# Patient Record
Sex: FEMALE | Race: WHITE | URBAN - METROPOLITAN AREA
[De-identification: names, ages, dates, MRNs, and addresses within clinical notes are randomized per-mention and may not be internally consistent; named-entity substitution may affect disease eponyms.]

---
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
## 2024-07-22 ENCOUNTER — APPOINTMENT (OUTPATIENT)
Dept: ORTHOPEDIC SURGERY | Facility: CLINIC | Age: 24
End: 2024-07-22
Payer: COMMERCIAL

## 2024-07-22 ENCOUNTER — TRANSCRIPTION ENCOUNTER (OUTPATIENT)
Age: 24
End: 2024-07-22

## 2024-07-22 VITALS — WEIGHT: 130 LBS | BODY MASS INDEX: 21.66 KG/M2 | HEIGHT: 65 IN

## 2024-07-22 DIAGNOSIS — M25.361 OTHER INSTABILITY, RIGHT KNEE: ICD-10-CM

## 2024-07-22 DIAGNOSIS — M22.40 CHONDROMALACIA PATELLAE, UNSPECIFIED KNEE: ICD-10-CM

## 2024-07-22 DIAGNOSIS — M22.2X1 PATELLOFEMORAL DISORDERS, RIGHT KNEE: ICD-10-CM

## 2024-07-22 DIAGNOSIS — Z78.9 OTHER SPECIFIED HEALTH STATUS: ICD-10-CM

## 2024-07-22 DIAGNOSIS — Z00.00 ENCOUNTER FOR GENERAL ADULT MEDICAL EXAMINATION W/OUT ABNORMAL FINDINGS: ICD-10-CM

## 2024-07-22 DIAGNOSIS — M25.362 OTHER INSTABILITY, RIGHT KNEE: ICD-10-CM

## 2024-07-22 DIAGNOSIS — M22.2X2 PATELLOFEMORAL DISORDERS, RIGHT KNEE: ICD-10-CM

## 2024-07-22 DIAGNOSIS — J45.909 UNSPECIFIED ASTHMA, UNCOMPLICATED: ICD-10-CM

## 2024-07-22 PROCEDURE — 99204 OFFICE O/P NEW MOD 45 MIN: CPT

## 2024-07-22 PROCEDURE — 73564 X-RAY EXAM KNEE 4 OR MORE: CPT | Mod: 50

## 2024-07-22 RX ORDER — SERTRALINE HYDROCHLORIDE 25 MG/1
TABLET, FILM COATED ORAL
Refills: 0 | Status: ACTIVE | COMMUNITY

## 2024-07-30 NOTE — IMAGING
[de-identified] : LEFT KNEE Inspection: minimal effusion Palpation: medial facet of the patella tenderness Knee Range of Motion: 0-135 Strength: 5/5 Quadriceps strength, 5/5 Hamstring strength, 4/5 Hip Abductor strength Neurological: light touch is intact throughout Ligament Stability and Special Tests: McMurrays: neg Lachman: neg Pivot Shift: neg Posterior Drawer: neg Valgus: neg Varus: neg Patella Apprehension: neg Patella Maltracking: neg  RIGHT KNEE Inspection: minimal effusion Palpation: medial facet of the patella tenderness Knee Range of Motion: 0-135 Strength: 5/5 Quadriceps strength, 5/5 Hamstring strength, 4/5 Hip Abductor strength Neurological: light touch is intact throughout Ligament Stability and Special Tests: McMurrays: neg Lachman: neg Pivot Shift: neg Posterior Drawer: neg Valgus: neg Varus: neg Patella Apprehension: neg Patella Maltracking: neg [Bilateral] : knee bilaterally [There are no fractures, subluxations or dislocations. No significant abnormalities are seen] : There are no fractures, subluxations or dislocations. No significant abnormalities are seen

## 2024-07-30 NOTE — DISCUSSION/SUMMARY
[de-identified] : Assessment:   The patient presents with history, examination and imaging that are most consistent with a diagnosis of: BL patellar instability, patellar chondromalacia, and patellar syndrome   ***The patient would like to pursue conservative measures at this time. After consideration of various non-operative treatment modalities, the patient would like to proceed with the modalities listed below.  During this appointment the patient was examined, diagnoses were discussed and explained in a face to face manner. In addition extensive time was spent reviewing aforementioned diagnostic studies. Counseling including abnormal image results, differential diagnoses, treatment options, risk and benefits, lifestyle changes, current condition, and current medications was performed. Patient's comments, questions, and concerns were address and patient verbalized understanding.   We discussed their diagnosis and treatment options at length including the risks and benefits of both surgical and nonsurgical options. - We will continue conservative treatment with icing, anti-inflammatory medication, and PT - PT for quad/VMO strengthening and progress to dynamic core and glut exercises - Patella stablization brace - We discussed that RTP can happen when full ROM, no effusion, no pain, full strength (this may take up to 3-4 months after the instability episode) - If they do not make an appropriate improvement with conservative treatment we discussed the possibility of surgical intervention in the future. - Follow up in 6 weeks to re-evaluate. ---------------------------     Plan: - start physical therapy - script provided - if subluxation episodes persist, will evaluate further with MRI

## 2024-07-30 NOTE — HISTORY OF PRESENT ILLNESS
[de-identified] : The patient is a 23 year old right hand dominant F who presents today complaining of Paresh knees.  Date of Injury/Onset: ~ 2014 Pain:    At Rest: 0/10 With Activity:  0/10 Mechanism of injury: ~ 2014 - 2020 patient states she has had subluxated both knees multiple times  This is [not] a Work Related Injury being treated under Worker's Compensation. This is [not] an athletic injury occurring associated with an interscholastic or organized sports team. Quality of symptoms: discomfort, weakness, swelling Improves with: nothing  Worse with: being active and overusing  Prior treatment/ Imaging: none  Out of work/sport: n/a School/Sport/Position/Occupation: just graduated/not working yet Additional Information: R>L knee pain

## 2024-07-30 NOTE — HISTORY OF PRESENT ILLNESS
[de-identified] : The patient is a 23 year old right hand dominant F who presents today complaining of Paresh knees.  Date of Injury/Onset: ~ 2014 Pain:    At Rest: 0/10 With Activity:  0/10 Mechanism of injury: ~ 2014 - 2020 patient states she has had subluxated both knees multiple times  This is [not] a Work Related Injury being treated under Worker's Compensation. This is [not] an athletic injury occurring associated with an interscholastic or organized sports team. Quality of symptoms: discomfort, weakness, swelling Improves with: nothing  Worse with: being active and overusing  Prior treatment/ Imaging: none  Out of work/sport: n/a School/Sport/Position/Occupation: just graduated/not working yet Additional Information: R>L knee pain

## 2024-07-30 NOTE — IMAGING
[de-identified] : LEFT KNEE Inspection: minimal effusion Palpation: medial facet of the patella tenderness Knee Range of Motion: 0-135 Strength: 5/5 Quadriceps strength, 5/5 Hamstring strength, 4/5 Hip Abductor strength Neurological: light touch is intact throughout Ligament Stability and Special Tests: McMurrays: neg Lachman: neg Pivot Shift: neg Posterior Drawer: neg Valgus: neg Varus: neg Patella Apprehension: neg Patella Maltracking: neg  RIGHT KNEE Inspection: minimal effusion Palpation: medial facet of the patella tenderness Knee Range of Motion: 0-135 Strength: 5/5 Quadriceps strength, 5/5 Hamstring strength, 4/5 Hip Abductor strength Neurological: light touch is intact throughout Ligament Stability and Special Tests: McMurrays: neg Lachman: neg Pivot Shift: neg Posterior Drawer: neg Valgus: neg Varus: neg Patella Apprehension: neg Patella Maltracking: neg [Bilateral] : knee bilaterally [There are no fractures, subluxations or dislocations. No significant abnormalities are seen] : There are no fractures, subluxations or dislocations. No significant abnormalities are seen

## 2024-07-30 NOTE — DISCUSSION/SUMMARY
Problem: PHYSICAL THERAPY ADULT  Goal: Performs mobility at highest level of function for planned discharge setting  See evaluation for individualized goals  Treatment/Interventions: Functional transfer training, LE strengthening/ROM, gait training, transfer training, Therapeutic exercise, Endurance training, Patient/family training, Equipment eval/education, Bed mobility, Compensatory technique education, Continued evaluation, Spoke to nursing, OT  Equipment Recommended:  (monitor)       See flowsheet documentation for full assessment, interventions and recommendations  Outcome: Progressing  Prognosis: Fair  Problem List: Decreased strength, Decreased range of motion, Decreased endurance, Impaired balance, Decreased mobility, Decreased coordination, Decreased safety awareness, Impaired tone, Impaired sensation, Impaired vision, Obesity, Decreased skin integrity  Assessment: Pt  supine in bed upon my arrival  Pt  eager to participate in therapy this PM  Performance of HEP supine in bed with A of therapist provided for proper completion  Progressed with transfers continuing to require max A of 2 with cues for hand placement/technique  Positioned seated at EOB for additional time with no noted LOB  Progressed with OOB mobility, pt  able to complete an amb  trial with use of RW and A of 2 with A required for lateral weight shift/advance of RLE due to foot drag at this time  After amb  trial pt  positioned in bedside chair  Pt  incontinent of stool, requiring several standing trials with use of RW and A of 2 with third present for pericare  Repositioned seated in bedside chair with OTR present at end of session  PT will continue to recommend rehab upon d/c for continued improvement of noted impairments above     Barriers to Discharge: Inaccessible home environment  Barriers to Discharge Comments: DAI  Recommendation: Other (Comment) (rehab)     PT - OK to Discharge: Yes (if d/c to rehab when medically [de-identified] : Assessment:   The patient presents with history, examination and imaging that are most consistent with a diagnosis of: BL patellar instability, patellar chondromalacia, and patellar syndrome   ***The patient would like to pursue conservative measures at this time. After consideration of various non-operative treatment modalities, the patient would like to proceed with the modalities listed below.  During this appointment the patient was examined, diagnoses were discussed and explained in a face to face manner. In addition extensive time was spent reviewing aforementioned diagnostic studies. Counseling including abnormal image results, differential diagnoses, treatment options, risk and benefits, lifestyle changes, current condition, and current medications was performed. Patient's comments, questions, and concerns were address and patient verbalized understanding.   We discussed their diagnosis and treatment options at length including the risks and benefits of both surgical and nonsurgical options. - We will continue conservative treatment with icing, anti-inflammatory medication, and PT - PT for quad/VMO strengthening and progress to dynamic core and glut exercises - Patella stablization brace - We discussed that RTP can happen when full ROM, no effusion, no pain, full strength (this may take up to 3-4 months after the instability episode) - If they do not make an appropriate improvement with conservative treatment we discussed the possibility of surgical intervention in the future. - Follow up in 6 weeks to re-evaluate. ---------------------------     Plan: - start physical therapy - script provided - if subluxation episodes persist, will evaluate further with MRI stable )    See flowsheet documentation for full assessment

## 2024-09-30 ENCOUNTER — APPOINTMENT (OUTPATIENT)
Dept: ORTHOPEDIC SURGERY | Facility: CLINIC | Age: 24
End: 2024-09-30